# Patient Record
Sex: MALE | URBAN - METROPOLITAN AREA
[De-identification: names, ages, dates, MRNs, and addresses within clinical notes are randomized per-mention and may not be internally consistent; named-entity substitution may affect disease eponyms.]

---

## 2018-01-30 ENCOUNTER — EMERGENCY (EMERGENCY)
Facility: HOSPITAL | Age: 53
LOS: 1 days | Discharge: HOME | End: 2018-01-30
Attending: EMERGENCY MEDICINE
Payer: MEDICAID

## 2018-01-30 VITALS
DIASTOLIC BLOOD PRESSURE: 93 MMHG | WEIGHT: 210.1 LBS | RESPIRATION RATE: 18 BRPM | TEMPERATURE: 98 F | HEART RATE: 100 BPM | SYSTOLIC BLOOD PRESSURE: 147 MMHG | OXYGEN SATURATION: 98 %

## 2018-01-30 PROCEDURE — 96372 THER/PROPH/DIAG INJ SC/IM: CPT

## 2018-01-30 PROCEDURE — 99284 EMERGENCY DEPT VISIT MOD MDM: CPT

## 2018-01-30 PROCEDURE — 73562 X-RAY EXAM OF KNEE 3: CPT | Mod: 26,LT

## 2018-01-30 PROCEDURE — 99283 EMERGENCY DEPT VISIT LOW MDM: CPT | Mod: 25

## 2018-01-30 PROCEDURE — 73562 X-RAY EXAM OF KNEE 3: CPT

## 2018-01-30 RX ORDER — ACETAMINOPHEN 500 MG
2 TABLET ORAL
Qty: 48 | Refills: 0 | OUTPATIENT
Start: 2018-01-30 | End: 2018-02-02

## 2018-01-30 RX ORDER — OXYCODONE AND ACETAMINOPHEN 5; 325 MG/1; MG/1
1 TABLET ORAL ONCE
Qty: 0 | Refills: 0 | Status: DISCONTINUED | OUTPATIENT
Start: 2018-01-30 | End: 2018-01-30

## 2018-01-30 RX ORDER — KETOROLAC TROMETHAMINE 30 MG/ML
60 SYRINGE (ML) INJECTION ONCE
Qty: 0 | Refills: 0 | Status: DISCONTINUED | OUTPATIENT
Start: 2018-01-30 | End: 2018-01-30

## 2018-01-30 RX ADMIN — Medication 60 MILLIGRAM(S): at 10:17

## 2018-01-30 RX ADMIN — Medication 60 MILLIGRAM(S): at 09:49

## 2018-01-30 RX ADMIN — OXYCODONE AND ACETAMINOPHEN 1 TABLET(S): 5; 325 TABLET ORAL at 07:43

## 2018-01-30 RX ADMIN — OXYCODONE AND ACETAMINOPHEN 1 TABLET(S): 5; 325 TABLET ORAL at 08:15

## 2018-01-30 NOTE — ED PROVIDER NOTE - LOWER EXTREMITY EXAM, LEFT
mild swelling of the L knee; neurovascularly intact, limited ROM secondary to pain but able to bear weight

## 2018-01-30 NOTE — ED ADULT NURSE NOTE - OBJECTIVE STATEMENT
Pt presents with left knee pain, states it started hurting 2 wks ago but pain was bearable. Pt is unable to flex left knee, sit down and/or perform ROM. Pt is A&O x3, breathing room air, nursing monitoring continues.

## 2018-01-30 NOTE — ED PROVIDER NOTE - MEDICAL DECISION MAKING DETAILS
L knee pain x 2 days,  no trauma, no fever, no redness, xray neg, will KYLEE, knee immobilizer, ortho follow up

## 2018-01-30 NOTE — ED PROVIDER NOTE - OBJECTIVE STATEMENT
51 y/o M pt with PMHx of Arthritis and no significant PSHx presents to ED c/o L knee pain with associated L knee swelling x2 days. Pt reports taking Advil as needed with no relief of pain. Pt denies fever, chills, L knee redness, or any other complaints. Pt also denies recent trauma to the L knee. NKDA.

## 2018-02-19 NOTE — ED PROCEDURE NOTE - CPROC ED POST PROC CARE GUIDE1
Verbal/written post procedure instructions were given to patient/caregiver./Instructed patient/caregiver to follow-up with primary care physician./Elevate the injured extremity as instructed./Instructed patient/caregiver regarding signs and symptoms of infection./Keep the cast/splint/dressing clean and dry.

## 2018-02-19 NOTE — ED PROCEDURE NOTE - NS ED PERI VASCULAR NEG
no paresthesia/fingers/toes warm to touch/capillary refill time < 2 seconds/no cyanosis of extremity

## 2024-07-17 NOTE — ED PROVIDER NOTE - VASCULAR COMPROMISE
not need added salt.  Rinse canned vegetables, and cook them in fresh water. This removes some--but not all--of the salt.  Avoid water that is naturally high in sodium or that has been treated with water softeners, which add sodium. If you buy bottled water, read the label and choose a sodium-free brand.  Avoid high-sodium foods  Avoid eating:  Smoked, cured, salted, and canned meat, fish, and poultry.  Ham, emerson, hot dogs, and luncheon meats.  Regular, hard, and processed cheese and regular peanut butter.  Crackers with salted tops, and other salted snack foods such as pretzels, chips, and salted popcorn.  Frozen prepared meals, unless labeled low-sodium.  Canned and dried soups, broths, and bouillon, unless labeled sodium-free or low-sodium.  Canned vegetables, unless labeled sodium-free or low-sodium.  French fries, pizza, tacos, and other fast foods.  Pickles, olives, ketchup, and other condiments, especially soy sauce, unless labeled sodium-free or low-sodium.  Where can you learn more?  Go to https://www.[employer].net/patientEd and enter V843 to learn more about \"Low Sodium Diet (2,000 Milligram): Care Instructions.\"  Current as of: September 20, 2023  Content Version: 14.1  © 5119-4424 [employer].   Care instructions adapted under license by [employer]. If you have questions about a medical condition or this instruction, always ask your healthcare professional. [employer] disclaims any warranty or liability for your use of this information.      For further patient and caregiver resources as well as access to online LVAD support groups visit [email or web address]       Please bring your daily sheets and medications to your next appointment.      Please monitor your weights daily upon waking and after using the bathroom. Keep a written records of your weights and bring to your next appointment. If you have a weight gain of 3 or more pounds overnight OR 5 or more pounds in one 
pulses full and equal bilaterally/no vascular compromise